# Patient Record
Sex: MALE | Race: WHITE | NOT HISPANIC OR LATINO | URBAN - METROPOLITAN AREA
[De-identification: names, ages, dates, MRNs, and addresses within clinical notes are randomized per-mention and may not be internally consistent; named-entity substitution may affect disease eponyms.]

---

## 2019-07-13 ENCOUNTER — APPOINTMENT (OUTPATIENT)
Dept: GENERAL RADIOLOGY | Facility: HOSPITAL | Age: 29
End: 2019-07-13

## 2019-07-13 ENCOUNTER — HOSPITAL ENCOUNTER (EMERGENCY)
Facility: HOSPITAL | Age: 29
Discharge: HOME | End: 2019-07-13
Admitting: EMERGENCY MEDICINE

## 2019-07-13 VITALS
HEART RATE: 55 BPM | TEMPERATURE: 98.6 F | OXYGEN SATURATION: 99 % | RESPIRATION RATE: 18 BRPM | HEIGHT: 71 IN | DIASTOLIC BLOOD PRESSURE: 74 MMHG | WEIGHT: 186.07 LBS | BODY MASS INDEX: 26.05 KG/M2 | SYSTOLIC BLOOD PRESSURE: 113 MMHG

## 2019-07-13 DIAGNOSIS — S93.402A SPRAIN OF LEFT ANKLE, UNSPECIFIED LIGAMENT, INITIAL ENCOUNTER: Primary | ICD-10-CM

## 2019-07-13 PROCEDURE — 73610 X-RAY EXAM OF ANKLE: CPT

## 2019-07-13 PROCEDURE — 99283 EMERGENCY DEPT VISIT LOW MDM: CPT

## 2019-07-13 RX ORDER — IBUPROFEN 800 MG/1
800 TABLET ORAL EVERY 8 HOURS PRN
Qty: 9 TABLET | Refills: 0 | Status: SHIPPED | OUTPATIENT
Start: 2019-07-13

## 2019-07-13 RX ORDER — IBUPROFEN 400 MG/1
800 TABLET ORAL ONCE
Status: COMPLETED | OUTPATIENT
Start: 2019-07-13 | End: 2019-07-13

## 2019-07-13 RX ADMIN — IBUPROFEN 800 MG: 400 TABLET ORAL at 13:05

## 2019-07-13 NOTE — ED NOTES
"Pt c/o trying to step on an incline and as soon as he stepped with his left foot, his left ankle rolled and he states \"I went down\". States \"I heard 7 pops and stayed down about 10 minutes\" states he iced his left ankle last night and today and it did seem to bring down the swelling slightly. Took medicine this morning for pain but it didn't seem to help much he states. Positive sensation to foot and ankle and can move toes. Is unable to bear barely any weight to his left foot he states.     Colette Marquez, RN  07/13/19 6980    "

## 2019-07-13 NOTE — ED PROVIDER NOTES
Subjective   Chief complaint: Ankle pain      Context: Patient is a 28-year-old male comes in with family complaining of left ankle pain after he rolled it yesterday.  He denies any other injuries from the incident while walking.  States he has had prior sprains and fractures but denies any surgeries.  He denies any numbness or tingling.    Duration: Yesterday    Timing:  Waxes and wanes  Severity: Moderate    Associated symptoms: Worse with range of motion weightbearing          PCP:  none              Review of Systems   Constitutional: Negative.    HENT: Negative.    Musculoskeletal: Positive for arthralgias.   Skin: Negative.    Neurological: Negative.        History reviewed. No pertinent past medical history.    No Known Allergies    History reviewed. No pertinent surgical history.    History reviewed. No pertinent family history.    Social History     Socioeconomic History   • Marital status: Single     Spouse name: Not on file   • Number of children: Not on file   • Years of education: Not on file   • Highest education level: Not on file   Tobacco Use   • Smoking status: Current Every Day Smoker   • Tobacco comment: States he smokes 1/3 pack/day   Substance and Sexual Activity   • Alcohol use: No     Frequency: Never   • Drug use: No   • Sexual activity: Defer           Objective   Physical Exam     Vital signs and traige nurse note reviewed.  Constitutional:  Awake, alert; well developed and well nourished.  No acute distress, the patient is examined in hospital gown.  HEENT:  Normocephalic, atraumatic;  with intact EOM; oropharynx is pink and moist without edema or erythema.  Neck: Supple, full range of motion without pain;   Cardiovascular: Regular rate and rhythm,    Pulmonary: Respiratory effort regular, nonlabored;   Musculoskeletal: Some pain to palpation noted over the left lateral malleolus.  No pain in the midfoot.  Distant resting sensorimotor intact.  No obvious deformity.  +3 DP and PT pulses.   No pain over the tib-fib or knee.  Neuro: Alert oriented x3, speech is clear and appropriate.      Procedures           ED Course        Labs Reviewed - No data to display  Medications   ibuprofen (ADVIL,MOTRIN) tablet 800 mg (800 mg Oral Given 7/13/19 1305)     Xr Ankle 3+ View Left    Result Date: 7/13/2019  1.No acute osseous abnormality.  Electronically Signed ByMeghan Reyes On:7/13/2019 1:59 PM This report was finalized on [id number] by  Travis Reyes, .              MDM  Number of Diagnoses or Management Options  Sprain of left ankle, unspecified ligament, initial encounter:   Diagnosis management comments: Medical decision  Comorbidities:  has no past medical history on file.  Differentials:  Discussion with provider:  Radiology interpretation:  X-rays reviewed by me and interpreted by radiologist,   Xr Ankle 3+ View Left    Result Date: 7/13/2019  1.No acute osseous abnormality.  Electronically Signed ByMeghan Reyes On:7/13/2019 1:59 PM This report was finalized on [id number] by  Travis Reyes, .    Patient was placed in a air stirrup and crutches.  Distal neurovascular resting sensorimotor intact after application.  Crutch walking and precautions instructed.    i discussed findings with patient who voices understanding of discharge instructions, signs and symptoms requiring return to ED; discharged improved and in stable condition with follow up for re-evaluation.  Charged home with ibuprofen          Final diagnoses:   Sprain of left ankle, unspecified ligament, initial encounter            Micaela Casiano, APRN  07/13/19 1427

## 2021-08-02 ENCOUNTER — HOSPITAL ENCOUNTER (EMERGENCY)
Facility: HOSPITAL | Age: 31
Discharge: HOME | End: 2021-08-02

## 2021-08-02 ENCOUNTER — APPOINTMENT (OUTPATIENT)
Dept: GENERAL RADIOLOGY | Facility: HOSPITAL | Age: 31
End: 2021-08-02

## 2021-08-02 ENCOUNTER — HOSPITAL ENCOUNTER (EMERGENCY)
Facility: HOSPITAL | Age: 31
Discharge: HOME | End: 2021-08-02
Admitting: EMERGENCY MEDICINE

## 2021-08-02 VITALS
WEIGHT: 173.5 LBS | OXYGEN SATURATION: 100 % | HEIGHT: 72 IN | SYSTOLIC BLOOD PRESSURE: 115 MMHG | RESPIRATION RATE: 23 BRPM | DIASTOLIC BLOOD PRESSURE: 72 MMHG | BODY MASS INDEX: 23.5 KG/M2 | TEMPERATURE: 98.1 F | HEART RATE: 91 BPM

## 2021-08-02 DIAGNOSIS — N39.0 URINARY TRACT INFECTION WITHOUT HEMATURIA, SITE UNSPECIFIED: Primary | ICD-10-CM

## 2021-08-02 DIAGNOSIS — Z20.2 STD EXPOSURE: ICD-10-CM

## 2021-08-02 LAB
ANION GAP SERPL CALCULATED.3IONS-SCNC: 8 MMOL/L (ref 5–15)
B PARAPERT DNA SPEC QL NAA+PROBE: NOT DETECTED
B PERT DNA SPEC QL NAA+PROBE: NOT DETECTED
BACTERIA UR QL AUTO: ABNORMAL /HPF
BASOPHILS # BLD AUTO: 0 10*3/MM3 (ref 0–0.2)
BASOPHILS NFR BLD AUTO: 0.5 % (ref 0–1.5)
BILIRUB UR QL STRIP: NEGATIVE
BUN SERPL-MCNC: 14 MG/DL (ref 6–20)
BUN/CREAT SERPL: 12.2 (ref 7–25)
C PNEUM DNA NPH QL NAA+NON-PROBE: NOT DETECTED
C TRACH RRNA CVX QL NAA+PROBE: NOT DETECTED
CALCIUM SPEC-SCNC: 9.2 MG/DL (ref 8.6–10.5)
CHLORIDE SERPL-SCNC: 99 MMOL/L (ref 98–107)
CLARITY UR: CLEAR
CO2 SERPL-SCNC: 29 MMOL/L (ref 22–29)
COLOR UR: YELLOW
CREAT SERPL-MCNC: 1.15 MG/DL (ref 0.76–1.27)
DEPRECATED RDW RBC AUTO: 42 FL (ref 37–54)
EOSINOPHIL # BLD AUTO: 0.1 10*3/MM3 (ref 0–0.4)
EOSINOPHIL NFR BLD AUTO: 1.3 % (ref 0.3–6.2)
ERYTHROCYTE [DISTWIDTH] IN BLOOD BY AUTOMATED COUNT: 13.6 % (ref 12.3–15.4)
FLUAV SUBTYP SPEC NAA+PROBE: NOT DETECTED
FLUBV RNA ISLT QL NAA+PROBE: NOT DETECTED
GFR SERPL CREATININE-BSD FRML MDRD: 74 ML/MIN/1.73
GLUCOSE SERPL-MCNC: 103 MG/DL (ref 65–99)
GLUCOSE UR STRIP-MCNC: NEGATIVE MG/DL
HADV DNA SPEC NAA+PROBE: NOT DETECTED
HCOV 229E RNA SPEC QL NAA+PROBE: NOT DETECTED
HCOV HKU1 RNA SPEC QL NAA+PROBE: NOT DETECTED
HCOV NL63 RNA SPEC QL NAA+PROBE: NOT DETECTED
HCOV OC43 RNA SPEC QL NAA+PROBE: NOT DETECTED
HCT VFR BLD AUTO: 48 % (ref 37.5–51)
HGB BLD-MCNC: 16.4 G/DL (ref 13–17.7)
HGB UR QL STRIP.AUTO: NEGATIVE
HMPV RNA NPH QL NAA+NON-PROBE: NOT DETECTED
HPIV1 RNA SPEC QL NAA+PROBE: NOT DETECTED
HPIV2 RNA SPEC QL NAA+PROBE: NOT DETECTED
HPIV3 RNA NPH QL NAA+PROBE: NOT DETECTED
HPIV4 P GENE NPH QL NAA+PROBE: NOT DETECTED
HYALINE CASTS UR QL AUTO: ABNORMAL /LPF
KETONES UR QL STRIP: NEGATIVE
LEUKOCYTE ESTERASE UR QL STRIP.AUTO: ABNORMAL
LYMPHOCYTES # BLD AUTO: 1.3 10*3/MM3 (ref 0.7–3.1)
LYMPHOCYTES NFR BLD AUTO: 18.1 % (ref 19.6–45.3)
M PNEUMO IGG SER IA-ACNC: NOT DETECTED
MCH RBC QN AUTO: 30.8 PG (ref 26.6–33)
MCHC RBC AUTO-ENTMCNC: 34 G/DL (ref 31.5–35.7)
MCV RBC AUTO: 90.6 FL (ref 79–97)
MONOCYTES # BLD AUTO: 0.9 10*3/MM3 (ref 0.1–0.9)
MONOCYTES NFR BLD AUTO: 12.2 % (ref 5–12)
N GONORRHOEA RRNA SPEC QL NAA+PROBE: DETECTED
NEUTROPHILS NFR BLD AUTO: 4.8 10*3/MM3 (ref 1.7–7)
NEUTROPHILS NFR BLD AUTO: 67.9 % (ref 42.7–76)
NITRITE UR QL STRIP: NEGATIVE
NRBC BLD AUTO-RTO: 0 /100 WBC (ref 0–0.2)
PH UR STRIP.AUTO: 5.5 [PH] (ref 5–8)
PLATELET # BLD AUTO: 238 10*3/MM3 (ref 140–450)
PMV BLD AUTO: 8 FL (ref 6–12)
POTASSIUM SERPL-SCNC: 4.6 MMOL/L (ref 3.5–5.2)
PROT UR QL STRIP: NEGATIVE
RBC # BLD AUTO: 5.3 10*6/MM3 (ref 4.14–5.8)
RBC # UR: ABNORMAL /HPF
REF LAB TEST METHOD: ABNORMAL
RHINOVIRUS RNA SPEC NAA+PROBE: NOT DETECTED
RSV RNA NPH QL NAA+NON-PROBE: NOT DETECTED
SARS-COV-2 RNA NPH QL NAA+NON-PROBE: NOT DETECTED
SODIUM SERPL-SCNC: 136 MMOL/L (ref 136–145)
SP GR UR STRIP: 1.02 (ref 1–1.03)
SQUAMOUS #/AREA URNS HPF: ABNORMAL /HPF
UROBILINOGEN UR QL STRIP: ABNORMAL
WBC # BLD AUTO: 7 10*3/MM3 (ref 3.4–10.8)
WBC UR QL AUTO: ABNORMAL /HPF

## 2021-08-02 PROCEDURE — 87491 CHLMYD TRACH DNA AMP PROBE: CPT | Performed by: PHYSICIAN ASSISTANT

## 2021-08-02 PROCEDURE — 25010000002 CEFTRIAXONE PER 250 MG: Performed by: PHYSICIAN ASSISTANT

## 2021-08-02 PROCEDURE — 99283 EMERGENCY DEPT VISIT LOW MDM: CPT

## 2021-08-02 PROCEDURE — 85025 COMPLETE CBC W/AUTO DIFF WBC: CPT | Performed by: PHYSICIAN ASSISTANT

## 2021-08-02 PROCEDURE — 96375 TX/PRO/DX INJ NEW DRUG ADDON: CPT

## 2021-08-02 PROCEDURE — 80048 BASIC METABOLIC PNL TOTAL CA: CPT | Performed by: PHYSICIAN ASSISTANT

## 2021-08-02 PROCEDURE — 71045 X-RAY EXAM CHEST 1 VIEW: CPT

## 2021-08-02 PROCEDURE — 0202U NFCT DS 22 TRGT SARS-COV-2: CPT | Performed by: PHYSICIAN ASSISTANT

## 2021-08-02 PROCEDURE — 81001 URINALYSIS AUTO W/SCOPE: CPT | Performed by: PHYSICIAN ASSISTANT

## 2021-08-02 PROCEDURE — 25010000002 DIPHENHYDRAMINE PER 50 MG: Performed by: PHYSICIAN ASSISTANT

## 2021-08-02 PROCEDURE — 25010000002 PROCHLORPERAZINE 10 MG/2ML SOLUTION: Performed by: PHYSICIAN ASSISTANT

## 2021-08-02 PROCEDURE — 96374 THER/PROPH/DIAG INJ IV PUSH: CPT

## 2021-08-02 PROCEDURE — 87086 URINE CULTURE/COLONY COUNT: CPT | Performed by: PHYSICIAN ASSISTANT

## 2021-08-02 PROCEDURE — 87591 N.GONORRHOEAE DNA AMP PROB: CPT | Performed by: PHYSICIAN ASSISTANT

## 2021-08-02 RX ORDER — DIPHENHYDRAMINE HYDROCHLORIDE 50 MG/ML
25 INJECTION INTRAMUSCULAR; INTRAVENOUS ONCE
Status: COMPLETED | OUTPATIENT
Start: 2021-08-02 | End: 2021-08-02

## 2021-08-02 RX ORDER — AZITHROMYCIN 250 MG/1
1000 TABLET, FILM COATED ORAL ONCE
Status: COMPLETED | OUTPATIENT
Start: 2021-08-02 | End: 2021-08-02

## 2021-08-02 RX ORDER — CEFDINIR 300 MG/1
300 CAPSULE ORAL 2 TIMES DAILY
Qty: 14 CAPSULE | Refills: 0 | Status: SHIPPED | OUTPATIENT
Start: 2021-08-02

## 2021-08-02 RX ORDER — PROCHLORPERAZINE EDISYLATE 5 MG/ML
10 INJECTION INTRAMUSCULAR; INTRAVENOUS ONCE
Status: COMPLETED | OUTPATIENT
Start: 2021-08-02 | End: 2021-08-02

## 2021-08-02 RX ADMIN — PROCHLORPERAZINE EDISYLATE 10 MG: 5 INJECTION INTRAMUSCULAR; INTRAVENOUS at 09:56

## 2021-08-02 RX ADMIN — SODIUM CHLORIDE 1000 ML: 9 INJECTION, SOLUTION INTRAVENOUS at 09:54

## 2021-08-02 RX ADMIN — CEFTRIAXONE SODIUM 1 G: 10 INJECTION, POWDER, FOR SOLUTION INTRAVENOUS at 11:52

## 2021-08-02 RX ADMIN — DIPHENHYDRAMINE HYDROCHLORIDE 25 MG: 50 INJECTION, SOLUTION INTRAMUSCULAR; INTRAVENOUS at 10:00

## 2021-08-02 RX ADMIN — AZITHROMYCIN MONOHYDRATE 1000 MG: 250 TABLET ORAL at 12:35

## 2021-08-02 NOTE — DISCHARGE INSTRUCTIONS
Take antibiotic as directed for your urinary tract infection.  Be sure to take full course.  Consider taking probiotic or eating yogurt daily while on antibiotic and up to 10 days after to replace the good bacteria in your gastrointestinal tract; this can reduce chances of developing a GI infection such as C difficile    Tylenol or ibuprofen as needed for fever or pain.  Drink plenty of clear fluids.    Follow-up with your primary care provider in 3-5 days.  If you do not have a primary care provider call [phone] for help in finding one, or you may follow up with [facility] at [phone].    Follow-up with the health department for further STD testing.    Return to ED for any new or worsening symptoms

## 2021-08-02 NOTE — ED PROVIDER NOTES
"Subjective   Patient is a 30-year-old male with no significant PMH who presents with complaints of body aches, chills, and a headache that started 2 days ago.  Patient denies thunderclap or worst headache of his life no one-sided numbness weakness slurred speech or facial droop.  He reports gradual onset states his headaches on the top of his head currently rates it a 3/10 in severity.  Does report some sensitivity to light and loud noise at times.  No neck pain or stiffness. He also reports lightheadedness with position change.  Patient denies any known fever.  He also reports he had some watery diarrhea that started today.  No hematochezia or melena.  He denies any nausea, vomiting, or abdominal pain.  Reports some dysuria denies any hematuria or flank pain.  No abnormal penile discharge swelling or pain.  No testicular swelling or pain.  Patient states he has had unprotected sex and states \"I have been checked for STDs in a while\".  Patient denies any chest pain or shortness of breath no cough rhinorrhea or nasal congestion no recent travel or known sick contacts.  Patient states he was recently on Keflex for tooth infection that he has since finished.  Does report improvement of his tooth pain.  No known exposure to anyone with COVID-19.  Patient has not had his Covid vaccines.          Review of Systems   Constitutional: Positive for chills and fatigue. Negative for activity change, appetite change, diaphoresis, fever and unexpected weight change.   Eyes: Negative for photophobia and visual disturbance.   Respiratory: Negative.    Cardiovascular: Negative.    Gastrointestinal: Positive for diarrhea. Negative for abdominal distention, abdominal pain, constipation, nausea and vomiting.   Genitourinary: Positive for dysuria. Negative for decreased urine volume, difficulty urinating, discharge, flank pain, frequency, genital sores, hematuria, penile pain, penile swelling, scrotal swelling, testicular pain and " urgency.   Musculoskeletal: Negative for back pain, neck pain and neck stiffness.   Skin: Negative.    Neurological: Positive for light-headedness and headaches. Negative for tremors, seizures, syncope, facial asymmetry, speech difficulty, weakness and numbness.   Hematological: Negative.        No past medical history on file.    No Known Allergies    No past surgical history on file.    No family history on file.    Social History     Socioeconomic History   • Marital status: Single     Spouse name: Not on file   • Number of children: Not on file   • Years of education: Not on file   • Highest education level: Not on file   Tobacco Use   • Smoking status: Current Every Day Smoker   • Tobacco comment: States he smokes 1/3 pack/day   Substance and Sexual Activity   • Alcohol use: No   • Drug use: No   • Sexual activity: Defer           Objective   Physical Exam  Vitals and nursing note reviewed. Exam conducted with a chaperone present.   Constitutional:       General: He is not in acute distress.     Appearance: Normal appearance. He is well-developed. He is not ill-appearing, toxic-appearing or diaphoretic.   HENT:      Head: Normocephalic and atraumatic.      Right Ear: Tympanic membrane, ear canal and external ear normal.      Left Ear: Tympanic membrane, ear canal and external ear normal.      Nose: Nose normal.      Mouth/Throat:      Mouth: Mucous membranes are moist.      Pharynx: Oropharynx is clear. Uvula midline. No pharyngeal swelling, oropharyngeal exudate, posterior oropharyngeal erythema or uvula swelling.      Tonsils: No tonsillar exudate or tonsillar abscesses.   Eyes:      Extraocular Movements: Extraocular movements intact.      Pupils: Pupils are equal, round, and reactive to light.   Cardiovascular:      Rate and Rhythm: Normal rate and regular rhythm.      Heart sounds: No murmur heard.   No friction rub. No gallop.    Pulmonary:      Effort: Pulmonary effort is normal. No tachypnea or  "accessory muscle usage.      Breath sounds: No decreased breath sounds, wheezing, rhonchi or rales.   Chest:      Chest wall: No mass, deformity, tenderness or crepitus.   Abdominal:      General: Bowel sounds are normal. There is no distension.      Palpations: Abdomen is soft. There is no mass.      Tenderness: There is no abdominal tenderness. There is no right CVA tenderness, left CVA tenderness, guarding or rebound.      Hernia: No hernia is present.   Genitourinary:     Pubic Area: No rash.       Penis: Normal and circumcised. No erythema, tenderness, discharge, swelling or lesions.       Testes: Normal. Cremasteric reflex is present.   Musculoskeletal:      Cervical back: Normal range of motion and neck supple. No rigidity.   Skin:     General: Skin is warm.      Capillary Refill: Capillary refill takes less than 2 seconds.      Findings: No rash.   Neurological:      General: No focal deficit present.      Mental Status: He is alert and oriented to person, place, and time.      GCS: GCS eye subscore is 4. GCS verbal subscore is 5. GCS motor subscore is 6.   Psychiatric:         Mood and Affect: Mood normal.         Behavior: Behavior normal.         Procedures           ED Course    /72   Pulse 84   Temp 98 °F (36.7 °C) (Oral)   Resp 23   Ht 182.9 cm (72\")   Wt 78.7 kg (173 lb 8 oz)   SpO2 100%   BMI 23.53 kg/m²   Medications   azithromycin (ZITHROMAX) tablet 1,000 mg (has no administration in time range)   sodium chloride 0.9 % bolus 1,000 mL (1,000 mL Intravenous New Bag 8/2/21 0939)   diphenhydrAMINE (BENADRYL) injection 25 mg (25 mg Intravenous Given 8/2/21 1000)   prochlorperazine (COMPAZINE) injection 10 mg (10 mg Intravenous Given 8/2/21 0956)   cefTRIAXone (ROCEPHIN) in SWFI 1 gram/10ml IV PUSH syringe (1 g Intravenous Given 8/2/21 1152)     Labs Reviewed   BASIC METABOLIC PANEL - Abnormal; Notable for the following components:       Result Value    Glucose 103 (*)     All other " components within normal limits    Narrative:     GFR Normal >60  Chronic Kidney Disease <60  Kidney Failure <15     URINALYSIS W/ CULTURE IF INDICATED - Abnormal; Notable for the following components:    Leuk Esterase, UA Small (1+) (*)     All other components within normal limits   CBC WITH AUTO DIFFERENTIAL - Abnormal; Notable for the following components:    Lymphocyte % 18.1 (*)     Monocyte % 12.2 (*)     All other components within normal limits   URINALYSIS, MICROSCOPIC ONLY - Abnormal; Notable for the following components:    RBC, UA 6-12 (*)     WBC, UA 21-30 (*)     All other components within normal limits   RESPIRATORY PANEL PCR W/ COVID-19 (SARS-COV-2) GONZALO/JUAN/GUANACO/PAD/[facility]/M[facility] IN-HOUSE, NP SWAB IN UT/VTP, 3-4 HR TAT - Normal    Narrative:     In the setting of a positive respiratory panel with a viral infection PLUS a negative procalcitonin without other underlying concern for bacterial infection, consider observing off antibiotics or discontinuation of antibiotics and continue supportive care. If the respiratory panel is positive for atypical bacterial infection (Bordetella pertussis, Chlamydophila pneumoniae, or Mycoplasma pneumoniae), consider antibiotic de-escalation to target atypical bacterial infection.   CHLAMYDIA TRACHOMATIS, NEISSERIA GONORRHOEAE, PCR   URINE CULTURE   CBC AND DIFFERENTIAL    Narrative:     The following orders were created for panel order CBC & Differential.  Procedure                               Abnormality         Status                     ---------                               -----------         ------                     CBC Auto Differential[[id number]]        Abnormal            Final result                 Please view results for these tests on the individual orders.     XR Chest 1 View    Result Date: 8/2/2021  No acute chest findings.  Electronically Signed By-Kymberly Antonio MD On:8/2/2021 9:31 AM This report was finalized on [id number] by  Kymberly  MD Marisela.                                         MDM  Number of Diagnoses or Management Options  STD exposure  Urinary tract infection without hematuria, site unspecified  Diagnosis management comments: Chart Review:  Comorbidity: None  Labs: Respiratory panel negative urinalysis significant for UTI with small leukocyte esterase 21-30 WBCs 6-12 RBCs urine culture pending.  Gonorrhea [place] chlamydia swab pending CBC shows WBC 7 hemoglobin 16.4 hematocrit 48 platelets 238 BMP shows glucose 103 otherwise unremarkable as above anion gap 8  Imaging: Was interpreted by physician and reviewed by myself:  XR Chest 1 View  Result Date: 8/2/2021  No acute chest findings.  Electronically Signed By-Kymberly Antonio MD On:8/2/2021 9:31 AM This report was finalized on [id number] by  Kymberly Antonio MD.    Disposition/Treatment:  Appropriate PPE was worn during exam and throughout all encounters with the patient.  While in the ED patient was afebrile and appeared nontoxic showed no acute cranial focal neuro deficits or meningeal signs.  Patient was given Benadryl and Compazine for his headache along with fluids.  On reassessment patient is resting quietly reports resolution of his headache.   lab results were significant for UTI urine culture pending.  Otherwise blood work was fairly unremarkable no signs of severe infection or electrolyte abnormality chest x-ray showed no acute cardiopulmonary abnormalities and respiratory panel was negative including COVID-19.  Gonorrhea/chlamydia PCR pending at this time patient would like to be pretreated for STDs patient was given Rocephin while in the ED for his UTI and pretreatment along with azithromycin p.o. as I do not think patient will be very reliable taking doxycycline for STD prophylaxis.  Patient symptoms likely secondary to UTI patient will be given Omnicef for home advised follow-up PCP further evaluation management.  Lab results and findings were discussed the patient who  voiced understanding of discharge and was in agreement with plan.  Patient made stable throughout his ED stay.  Patient was also given a work note.       Amount and/or Complexity of Data Reviewed  Clinical lab tests: reviewed  Tests in the radiology section of CPT®: reviewed        Final diagnoses:   Urinary tract infection without hematuria, site unspecified   STD exposure       ED Disposition  ED Disposition     ED Disposition Condition Comment    Discharge Stable           [facility] EMERGENCY DEPARTMENT  [address] [address] [phone]  Go to   If symptoms worsen    PATIENT CONNECTION - [facility] [id number] [phone]  Call   If you do not have a primary care provider    [facility]  [address] [facility] [address] [phone]  Call   For further STD testing         Medication List      New Prescriptions    cefdinir 300 MG capsule  Commonly known as: OMNICEF  Take 1 capsule by mouth 2 (Two) Times a Day.           Where to Get Your Medications      These medications were sent to [facility]/pharmacy #[id number] - [facility] [place] - [address] [phone] [place] [phone]   [address] [place] [address]    Hours: 24-hours Phone: [phone]   · cefdinir 300 MG capsule          Fab Harrell PA  08/02/21 1235

## 2021-08-03 LAB — BACTERIA SPEC AEROBE CULT: NO GROWTH

## 2021-08-11 ENCOUNTER — HOSPITAL ENCOUNTER (EMERGENCY)
Facility: HOSPITAL | Age: 31
Discharge: LEFT BEFORE DISCHARGE | End: 2021-08-11

## 2021-08-11 VITALS
BODY MASS INDEX: 22.93 KG/M2 | WEIGHT: 169.31 LBS | SYSTOLIC BLOOD PRESSURE: 118 MMHG | HEART RATE: 107 BPM | OXYGEN SATURATION: 97 % | HEIGHT: 72 IN | TEMPERATURE: 97.9 F | RESPIRATION RATE: 12 BRPM | DIASTOLIC BLOOD PRESSURE: 71 MMHG

## 2021-08-11 PROCEDURE — 99211 OFF/OP EST MAY X REQ PHY/QHP: CPT
